# Patient Record
Sex: FEMALE | Race: WHITE | Employment: PART TIME | URBAN - METROPOLITAN AREA
[De-identification: names, ages, dates, MRNs, and addresses within clinical notes are randomized per-mention and may not be internally consistent; named-entity substitution may affect disease eponyms.]

---

## 2021-04-21 ENCOUNTER — ANESTHESIA (OUTPATIENT)
Dept: OPERATING ROOM | Age: 66
End: 2021-04-21
Payer: MEDICARE

## 2021-04-21 ENCOUNTER — HOSPITAL ENCOUNTER (OUTPATIENT)
Dept: GENERAL RADIOLOGY | Age: 66
Setting detail: OUTPATIENT SURGERY
Discharge: HOME | End: 2021-04-23
Attending: ORTHOPAEDIC SURGERY
Payer: MEDICARE

## 2021-04-21 ENCOUNTER — HOSPITAL ENCOUNTER (OUTPATIENT)
Age: 66
Setting detail: OUTPATIENT SURGERY
Discharge: HOME | End: 2021-04-21
Attending: ORTHOPAEDIC SURGERY | Admitting: ORTHOPAEDIC SURGERY
Payer: MEDICARE

## 2021-04-21 ENCOUNTER — ANESTHESIA EVENT (OUTPATIENT)
Dept: OPERATING ROOM | Age: 66
End: 2021-04-21
Payer: MEDICARE

## 2021-04-21 VITALS
HEIGHT: 60 IN | TEMPERATURE: 98.1 F | WEIGHT: 158 LBS | HEART RATE: 74 BPM | RESPIRATION RATE: 18 BRPM | SYSTOLIC BLOOD PRESSURE: 123 MMHG | BODY MASS INDEX: 31.02 KG/M2 | OXYGEN SATURATION: 97 % | DIASTOLIC BLOOD PRESSURE: 60 MMHG

## 2021-04-21 VITALS — DIASTOLIC BLOOD PRESSURE: 58 MMHG | OXYGEN SATURATION: 96 % | SYSTOLIC BLOOD PRESSURE: 115 MMHG

## 2021-04-21 DIAGNOSIS — R52 PAIN: ICD-10-CM

## 2021-04-21 LAB — SARS-COV-2, NAAT: NOT DETECTED

## 2021-04-21 PROCEDURE — 2580000003 HC RX 258: Performed by: ORTHOPAEDIC SURGERY

## 2021-04-21 PROCEDURE — 7100000011 HC PHASE II RECOVERY - ADDTL 15 MIN: Performed by: ORTHOPAEDIC SURGERY

## 2021-04-21 PROCEDURE — 7100000010 HC PHASE II RECOVERY - FIRST 15 MIN: Performed by: ORTHOPAEDIC SURGERY

## 2021-04-21 PROCEDURE — 3700000000 HC ANESTHESIA ATTENDED CARE: Performed by: ORTHOPAEDIC SURGERY

## 2021-04-21 PROCEDURE — 6360000002 HC RX W HCPCS: Performed by: ANESTHESIOLOGY

## 2021-04-21 PROCEDURE — 3600000004 HC SURGERY LEVEL 4 BASE: Performed by: ORTHOPAEDIC SURGERY

## 2021-04-21 PROCEDURE — 2709999900 HC NON-CHARGEABLE SUPPLY: Performed by: ORTHOPAEDIC SURGERY

## 2021-04-21 PROCEDURE — C1713 ANCHOR/SCREW BN/BN,TIS/BN: HCPCS | Performed by: ORTHOPAEDIC SURGERY

## 2021-04-21 PROCEDURE — 6360000002 HC RX W HCPCS: Performed by: ORTHOPAEDIC SURGERY

## 2021-04-21 PROCEDURE — 6370000000 HC RX 637 (ALT 250 FOR IP): Performed by: ANESTHESIOLOGY

## 2021-04-21 PROCEDURE — 3209999900 FLUORO FOR SURGICAL PROCEDURES

## 2021-04-21 PROCEDURE — 6360000002 HC RX W HCPCS: Performed by: NURSE ANESTHETIST, CERTIFIED REGISTERED

## 2021-04-21 PROCEDURE — 87635 SARS-COV-2 COVID-19 AMP PRB: CPT

## 2021-04-21 PROCEDURE — 3700000001 HC ADD 15 MINUTES (ANESTHESIA): Performed by: ORTHOPAEDIC SURGERY

## 2021-04-21 PROCEDURE — 3600000014 HC SURGERY LEVEL 4 ADDTL 15MIN: Performed by: ORTHOPAEDIC SURGERY

## 2021-04-21 PROCEDURE — 2500000003 HC RX 250 WO HCPCS: Performed by: ORTHOPAEDIC SURGERY

## 2021-04-21 DEVICE — IMPLANTABLE DEVICE: Type: IMPLANTABLE DEVICE | Status: FUNCTIONAL

## 2021-04-21 RX ORDER — SIMVASTATIN 20 MG
TABLET ORAL
COMMUNITY

## 2021-04-21 RX ORDER — TRAZODONE HYDROCHLORIDE 100 MG/1
50 TABLET ORAL
COMMUNITY

## 2021-04-21 RX ORDER — CALCIUM CARBONATE 200(500)MG
TABLET,CHEWABLE ORAL
COMMUNITY
Start: 2020-07-23

## 2021-04-21 RX ORDER — PROPOFOL 10 MG/ML
INJECTION, EMULSION INTRAVENOUS CONTINUOUS PRN
Status: DISCONTINUED | OUTPATIENT
Start: 2021-04-21 | End: 2021-04-21 | Stop reason: SDUPTHER

## 2021-04-21 RX ORDER — LIDOCAINE HYDROCHLORIDE 10 MG/ML
1 INJECTION, SOLUTION EPIDURAL; INFILTRATION; INTRACAUDAL; PERINEURAL
Status: COMPLETED | OUTPATIENT
Start: 2021-04-21 | End: 2021-04-21

## 2021-04-21 RX ORDER — MAGNESIUM HYDROXIDE 1200 MG/15ML
LIQUID ORAL CONTINUOUS PRN
Status: COMPLETED | OUTPATIENT
Start: 2021-04-21 | End: 2021-04-21

## 2021-04-21 RX ORDER — DIPHENHYDRAMINE HYDROCHLORIDE 50 MG/ML
12.5 INJECTION INTRAMUSCULAR; INTRAVENOUS
Status: DISCONTINUED | OUTPATIENT
Start: 2021-04-21 | End: 2021-04-21 | Stop reason: HOSPADM

## 2021-04-21 RX ORDER — BUPIVACAINE HYDROCHLORIDE 5 MG/ML
30 INJECTION, SOLUTION EPIDURAL; INTRACAUDAL
Status: COMPLETED | OUTPATIENT
Start: 2021-04-21 | End: 2021-04-21

## 2021-04-21 RX ORDER — OXYBUTYNIN CHLORIDE 10 MG/1
TABLET, EXTENDED RELEASE ORAL
COMMUNITY

## 2021-04-21 RX ORDER — PROPOFOL 10 MG/ML
INJECTION, EMULSION INTRAVENOUS PRN
Status: DISCONTINUED | OUTPATIENT
Start: 2021-04-21 | End: 2021-04-21 | Stop reason: SDUPTHER

## 2021-04-21 RX ORDER — FENTANYL CITRATE 50 UG/ML
25 INJECTION, SOLUTION INTRAMUSCULAR; INTRAVENOUS EVERY 10 MIN PRN
Status: DISCONTINUED | OUTPATIENT
Start: 2021-04-21 | End: 2021-04-21 | Stop reason: HOSPADM

## 2021-04-21 RX ORDER — HYDROCODONE BITARTRATE AND ACETAMINOPHEN 5; 325 MG/1; MG/1
1 TABLET ORAL PRN
Status: COMPLETED | OUTPATIENT
Start: 2021-04-21 | End: 2021-04-21

## 2021-04-21 RX ORDER — ATENOLOL 50 MG/1
TABLET ORAL
COMMUNITY

## 2021-04-21 RX ORDER — ALBUTEROL SULFATE 90 UG/1
AEROSOL, METERED RESPIRATORY (INHALATION)
COMMUNITY

## 2021-04-21 RX ORDER — MIDAZOLAM HYDROCHLORIDE 1 MG/ML
INJECTION INTRAMUSCULAR; INTRAVENOUS PRN
Status: DISCONTINUED | OUTPATIENT
Start: 2021-04-21 | End: 2021-04-21 | Stop reason: SDUPTHER

## 2021-04-21 RX ORDER — CEFAZOLIN SODIUM 2 G/50ML
2000 SOLUTION INTRAVENOUS
Status: COMPLETED | OUTPATIENT
Start: 2021-04-21 | End: 2021-04-21

## 2021-04-21 RX ORDER — HYDROCODONE BITARTRATE AND ACETAMINOPHEN 5; 325 MG/1; MG/1
2 TABLET ORAL PRN
Status: COMPLETED | OUTPATIENT
Start: 2021-04-21 | End: 2021-04-21

## 2021-04-21 RX ORDER — LISINOPRIL 20 MG/1
TABLET ORAL
COMMUNITY

## 2021-04-21 RX ORDER — ONDANSETRON 2 MG/ML
4 INJECTION INTRAMUSCULAR; INTRAVENOUS
Status: DISCONTINUED | OUTPATIENT
Start: 2021-04-21 | End: 2021-04-21 | Stop reason: HOSPADM

## 2021-04-21 RX ORDER — MEPERIDINE HYDROCHLORIDE 25 MG/ML
12.5 INJECTION INTRAMUSCULAR; INTRAVENOUS; SUBCUTANEOUS EVERY 5 MIN PRN
Status: DISCONTINUED | OUTPATIENT
Start: 2021-04-21 | End: 2021-04-21 | Stop reason: HOSPADM

## 2021-04-21 RX ORDER — POTASSIUM CHLORIDE 750 MG/1
TABLET, FILM COATED, EXTENDED RELEASE ORAL
COMMUNITY

## 2021-04-21 RX ORDER — LIDOCAINE HYDROCHLORIDE 10 MG/ML
10 INJECTION, SOLUTION EPIDURAL; INFILTRATION; INTRACAUDAL; PERINEURAL
Status: COMPLETED | OUTPATIENT
Start: 2021-04-21 | End: 2021-04-21

## 2021-04-21 RX ORDER — ESTRADIOL 0.1 MG/G
CREAM VAGINAL
COMMUNITY
Start: 2020-06-26

## 2021-04-21 RX ORDER — ASCORBIC ACID 500 MG
500 TABLET ORAL
COMMUNITY
Start: 2020-07-23

## 2021-04-21 RX ORDER — SODIUM CHLORIDE, SODIUM LACTATE, POTASSIUM CHLORIDE, CALCIUM CHLORIDE 600; 310; 30; 20 MG/100ML; MG/100ML; MG/100ML; MG/100ML
INJECTION, SOLUTION INTRAVENOUS CONTINUOUS
Status: DISCONTINUED | OUTPATIENT
Start: 2021-04-21 | End: 2021-04-21 | Stop reason: HOSPADM

## 2021-04-21 RX ORDER — FUROSEMIDE 20 MG/1
TABLET ORAL
COMMUNITY

## 2021-04-21 RX ADMIN — HYDROCODONE BITARTRATE AND ACETAMINOPHEN 1 TABLET: 5; 325 TABLET ORAL at 12:14

## 2021-04-21 RX ADMIN — LIDOCAINE HYDROCHLORIDE 5 ML: 10 INJECTION, SOLUTION EPIDURAL; INFILTRATION; INTRACAUDAL; PERINEURAL at 10:51

## 2021-04-21 RX ADMIN — BUPIVACAINE HYDROCHLORIDE 5 ML: 5 INJECTION, SOLUTION EPIDURAL; INTRACAUDAL at 10:49

## 2021-04-21 RX ADMIN — LIDOCAINE HYDROCHLORIDE 0.1 ML: 10 INJECTION, SOLUTION EPIDURAL; INFILTRATION; INTRACAUDAL; PERINEURAL at 10:35

## 2021-04-21 RX ADMIN — PROPOFOL 120 MCG/KG/MIN: 10 INJECTION, EMULSION INTRAVENOUS at 11:15

## 2021-04-21 RX ADMIN — PROPOFOL 40 MG: 10 INJECTION, EMULSION INTRAVENOUS at 11:15

## 2021-04-21 RX ADMIN — CEFAZOLIN SODIUM 2000 MG: 2 SOLUTION INTRAVENOUS at 11:15

## 2021-04-21 RX ADMIN — SODIUM CHLORIDE, POTASSIUM CHLORIDE, SODIUM LACTATE AND CALCIUM CHLORIDE: 600; 310; 30; 20 INJECTION, SOLUTION INTRAVENOUS at 10:36

## 2021-04-21 RX ADMIN — MIDAZOLAM HYDROCHLORIDE 2 MG: 2 INJECTION, SOLUTION INTRAMUSCULAR; INTRAVENOUS at 11:07

## 2021-04-21 ASSESSMENT — PULMONARY FUNCTION TESTS
PIF_VALUE: 0

## 2021-04-21 ASSESSMENT — PAIN SCALES - GENERAL: PAINLEVEL_OUTOF10: 0

## 2021-04-21 ASSESSMENT — COPD QUESTIONNAIRES: CAT_SEVERITY: MILD

## 2021-04-21 NOTE — ANESTHESIA PRE PROCEDURE
Provider Last Rate Last Admin    lactated ringers infusion   Intravenous Continuous Harlan Moreau  mL/hr at 04/21/21 1036 New Bag at 04/21/21 1036    ceFAZolin (ANCEF) 2000 mg in dextrose 3 % 50 mL IVPB (duplex)  2,000 mg Intravenous On Call to Cecil Damico DO           Allergies: Allergies   Allergen Reactions    Oxycodone-Acetaminophen Nausea And Vomiting       Problem List:  There is no problem list on file for this patient. Past Medical History:        Diagnosis Date    Asthma     Hypertension        Past Surgical History:  History reviewed. No pertinent surgical history. Social History:    Social History     Tobacco Use    Smoking status: Not on file   Substance Use Topics    Alcohol use: Not on file                                Counseling given: Not Answered      Vital Signs (Current):   Vitals:    04/21/21 1022   BP: 139/67   Pulse: 73   Resp: 16   Temp: 98 °F (36.7 °C)   TempSrc: Temporal   SpO2: 97%   Weight: 158 lb (71.7 kg)   Height: 5' (1.524 m)                                              BP Readings from Last 3 Encounters:   04/21/21 139/67       NPO Status: Time of last liquid consumption: 2300                        Time of last solid consumption: 2200                        Date of last liquid consumption: 04/20/21                        Date of last solid food consumption: 04/20/21    BMI:   Wt Readings from Last 3 Encounters:   04/21/21 158 lb (71.7 kg)     Body mass index is 30.86 kg/m². CBC: No results found for: WBC, RBC, HGB, HCT, MCV, RDW, PLT    CMP: No results found for: NA, K, CL, CO2, BUN, CREATININE, GFRAA, AGRATIO, LABGLOM, GLUCOSE, PROT, CALCIUM, BILITOT, ALKPHOS, AST, ALT    POC Tests: No results for input(s): POCGLU, POCNA, POCK, POCCL, POCBUN, POCHEMO, POCHCT in the last 72 hours.     Coags: No results found for: PROTIME, INR, APTT    HCG (If Applicable): No results found for: PREGTESTUR, PREGSERUM, HCG, HCGQUANT     ABGs: No results found for: PHART, PO2ART, STM1TBP, CMK7JTY, BEART, Q7IRMRGT     Type & Screen (If Applicable):  No results found for: LABABO, LABRH    Drug/Infectious Status (If Applicable):  No results found for: HIV, HEPCAB    COVID-19 Screening (If Applicable):   Lab Results   Component Value Date    COVID19 Not Detected 04/21/2021           Anesthesia Evaluation    Airway: Mallampati: II  TM distance: >3 FB   Neck ROM: full  Mouth opening: > = 3 FB Dental:    (+) upper dentures      Pulmonary: breath sounds clear to auscultation  (+) COPD: mild,                             Cardiovascular:    (+) hypertension:,         Rhythm: regular             Beta Blocker:  Dose within 24 Hrs         Neuro/Psych:   Negative Neuro/Psych ROS              GI/Hepatic/Renal:   (+) GERD:,           Endo/Other: Negative Endo/Other ROS                    Abdominal:           Vascular: negative vascular ROS. Anesthesia Plan      MAC     ASA 3       Induction: intravenous. MIPS: Prophylactic antiemetics administered. Anesthetic plan and risks discussed with patient.       Plan discussed with surgical team.    Attending anesthesiologist reviewed and agrees with Pre Eval content              Kathy Willis MD   4/21/2021

## 2021-04-21 NOTE — ANESTHESIA POSTPROCEDURE EVALUATION
Department of Anesthesiology  Postprocedure Note    Patient: Hawa Duong  MRN: [id number]  YOB: 1955  Date of evaluation: 4/21/2021  Time:  11:44 AM     Procedure Summary     Date: 04/21/21 Room / Location: [address]    Anesthesia Start: 1109 Anesthesia Stop: 1807    Procedure: RIGHT CLOSED REDUCTION PERCUTANEOUS PINNING RIGHT LONG BONY MALLET (Right ) Diagnosis: (RIGHT LONG FINGER DISTAL PHALANX FRACTURE, BONY MALLET)    Surgeons: Ligia Ivan DO Responsible Provider: Afsaneh Mac MD    Anesthesia Type: MAC ASA Status: 3          Anesthesia Type: MAC    Anika Phase I:      Anika Phase II:      Last vitals: Reviewed and per EMR flowsheets.        Anesthesia Post Evaluation    Patient location during evaluation: PACU  Patient participation: complete - patient participated  Level of consciousness: awake and alert  Pain score: 0  Airway patency: patent  Nausea & Vomiting: no vomiting and no nausea  Complications: no  Cardiovascular status: hemodynamically stable  Respiratory status: room air  Hydration status: stable

## 2021-04-21 NOTE — OP NOTE
The patient was prepped and draped in the normal sterile fashion. Fluoroscopic imaging was brought in. The right long finger distal interphalangeal joint was placed into passive flexion. A K wire was started just radial to midline over the dorsum of the long finger just proximal to the bony mallet fracture fragment. The K wire was delivered bicortically into the middle phalanx. In a similar fashion a second K wire was placed again just dorsal and proximal to the bony mallet fracture fragment and anchored bicortically into the middle phalanx. Passive extension was then imparted to the distal end phalangeal joint thereby reducing the main body of P3 to the bony mallet fracture fragment. A 4 5 K wire was started at the tip of the distal phalanx and delivered in a retrograde fashion bicortically across the distal phalanx, traversing the distal interphalangeal joint, and anchored bicortically into the middle phalanx. The longitudinal K wire was cut in a subcutaneous position. The 2 dorsal K wires were cut and bent outside of the skin. Final images were sent to the PACS system. A soft dressing along with AlumaFoam splint was applied. The patient was allowed to head to recovery in stable condition. Overall she tolerated procedure well. Disposition: Stable to PACU          Implants:  Implant Name Type Inv.  Item Serial No.  Lot No. LRB No. Used Action   WIRE FIX NOAH [KWIRE] [[employer]]  WIRE FIX NOAH [KWIRE] [[employer]]  [employer] [address] [employer]  Right 1 Implanted             Electronically signed by Theo Ling DO on 4/21/2021 at 11:36 AM

## 2024-02-14 ENCOUNTER — APPOINTMENT (OUTPATIENT)
Dept: RADIOLOGY | Facility: HOSPITAL | Age: 69
End: 2024-02-14
Payer: COMMERCIAL

## 2024-02-14 ENCOUNTER — HOSPITAL ENCOUNTER (EMERGENCY)
Facility: HOSPITAL | Age: 69
Discharge: HOME | End: 2024-02-14
Payer: COMMERCIAL

## 2024-02-14 VITALS
RESPIRATION RATE: 16 BRPM | BODY MASS INDEX: 35.34 KG/M2 | TEMPERATURE: 97.3 F | SYSTOLIC BLOOD PRESSURE: 177 MMHG | HEIGHT: 60 IN | OXYGEN SATURATION: 97 % | WEIGHT: 180 LBS | DIASTOLIC BLOOD PRESSURE: 79 MMHG | HEART RATE: 60 BPM

## 2024-02-14 DIAGNOSIS — S60.221A CONTUSION OF RIGHT HAND, INITIAL ENCOUNTER: ICD-10-CM
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
## 2024-02-14 DIAGNOSIS — S93.401A SPRAIN OF RIGHT ANKLE, UNSPECIFIED LIGAMENT, INITIAL ENCOUNTER: ICD-10-CM

## 2024-02-14 DIAGNOSIS — S01.81XA FACIAL LACERATION, INITIAL ENCOUNTER: ICD-10-CM

## 2024-02-14 PROCEDURE — 12011 RPR F/E/E/N/L/M 2.5 CM/<: CPT

## 2024-02-14 PROCEDURE — 73610 X-RAY EXAM OF ANKLE: CPT | Mod: RT

## 2024-02-14 PROCEDURE — 70450 CT HEAD/BRAIN W/O DYE: CPT

## 2024-02-14 PROCEDURE — 73130 X-RAY EXAM OF HAND: CPT | Mod: RIGHT SIDE | Performed by: STUDENT IN AN ORGANIZED HEALTH CARE EDUCATION/TRAINING PROGRAM

## 2024-02-14 PROCEDURE — 2500000001 HC RX 250 WO HCPCS SELF ADMINISTERED DRUGS (ALT 637 FOR MEDICARE OP)

## 2024-02-14 PROCEDURE — 73110 X-RAY EXAM OF WRIST: CPT | Mod: RIGHT SIDE | Performed by: STUDENT IN AN ORGANIZED HEALTH CARE EDUCATION/TRAINING PROGRAM

## 2024-02-14 PROCEDURE — 73110 X-RAY EXAM OF WRIST: CPT | Mod: RT

## 2024-02-14 PROCEDURE — 73130 X-RAY EXAM OF HAND: CPT | Mod: RT

## 2024-02-14 PROCEDURE — 73610 X-RAY EXAM OF ANKLE: CPT | Mod: RIGHT SIDE | Performed by: STUDENT IN AN ORGANIZED HEALTH CARE EDUCATION/TRAINING PROGRAM

## 2024-02-14 PROCEDURE — 76377 3D RENDER W/INTRP POSTPROCES: CPT

## 2024-02-14 PROCEDURE — 99285 EMERGENCY DEPT VISIT HI MDM: CPT | Mod: 25

## 2024-02-14 PROCEDURE — 72125 CT NECK SPINE W/O DYE: CPT

## 2024-02-14 PROCEDURE — 70486 CT MAXILLOFACIAL W/O DYE: CPT

## 2024-02-14 RX ORDER — VALSARTAN 40 MG/1
40 TABLET ORAL ONCE
Status: COMPLETED | OUTPATIENT
Start: 2024-02-14 | End: 2024-02-14

## 2024-02-14 RX ORDER — BACITRACIN ZINC 500 UNIT/G
1 OINTMENT (GRAM) TOPICAL 2 TIMES DAILY
Qty: 1 G | Refills: 0 | Status: SHIPPED | OUTPATIENT
Start: 2024-02-14 | End: 2024-02-19

## 2024-02-14 RX ORDER — ACETAMINOPHEN 500 MG
500 TABLET ORAL EVERY 6 HOURS PRN
Qty: 28 TABLET | Refills: 0 | Status: SHIPPED | OUTPATIENT
Start: 2024-02-14 | End: 2024-02-21

## 2024-02-14 RX ADMIN — VALSARTAN 40 MG: 40 TABLET, FILM COATED ORAL at 21:30

## 2024-02-14 ASSESSMENT — LIFESTYLE VARIABLES
EVER FELT BAD OR GUILTY ABOUT YOUR DRINKING: NO
EVER HAD A DRINK FIRST THING IN THE MORNING TO STEADY YOUR NERVES TO GET RID OF A HANGOVER: NO
HAVE PEOPLE ANNOYED YOU BY CRITICIZING YOUR DRINKING: NO
HAVE YOU EVER FELT YOU SHOULD CUT DOWN ON YOUR DRINKING: NO

## 2024-02-14 ASSESSMENT — PAIN DESCRIPTION - PAIN TYPE: TYPE: ACUTE PAIN

## 2024-02-14 ASSESSMENT — PAIN - FUNCTIONAL ASSESSMENT: PAIN_FUNCTIONAL_ASSESSMENT: 0-10

## 2024-02-14 ASSESSMENT — COLUMBIA-SUICIDE SEVERITY RATING SCALE - C-SSRS
2. HAVE YOU ACTUALLY HAD ANY THOUGHTS OF KILLING YOURSELF?: NO
6. HAVE YOU EVER DONE ANYTHING, STARTED TO DO ANYTHING, OR PREPARED TO DO ANYTHING TO END YOUR LIFE?: NO
1. IN THE PAST MONTH, HAVE YOU WISHED YOU WERE DEAD OR WISHED YOU COULD GO TO SLEEP AND NOT WAKE UP?: NO

## 2024-02-14 ASSESSMENT — PAIN DESCRIPTION - LOCATION: LOCATION: ANKLE

## 2024-02-14 ASSESSMENT — PAIN DESCRIPTION - ORIENTATION: ORIENTATION: RIGHT

## 2024-02-14 ASSESSMENT — PAIN SCALES - GENERAL: PAINLEVEL_OUTOF10: 2

## 2024-02-14 NOTE — Clinical Note
Roselia Alvarez was seen and treated in our emergency department on 2/14/2024.  She may return to work on 02/16/2024.       If you have any questions or concerns, please don't hesitate to call.      Avery Saleh PA-C

## 2024-02-15 NOTE — ED PROVIDER NOTES
HPI   Chief Complaint   Patient presents with    Fall     Pt tripped in parking lot and fell, complaining of R ankle pain and has a laceration over L eye, bleeding controlled. Denies blood thinners, LOC.        History provided by: Patient and patient's coworker    Limitations to history: None    CC: Fall    HPI: 69-year-old female presents emergency department to be evaluated for a fall that occurred about an hour prior to arrival.  Patient was getting off work when she tripped in a pothole in the parking lot causing her to fall.  She did hit the left side of her face on the pothole and has a cut on the left side of her face that bled for a few minutes but is now controlled on external pressure.  Her tetanus is up-to-date.  She denies headache, vision changes, neck pain, back pain, lightheadedness and dizziness.  Denies any injury to her eye.  She denies use of anticoagulants or history of intracranial hemorrhage.  Coworkers with her denies behavior mental status changes.  She is currently complaining of pain where she hit her face as well as in her right wrist and her right ankle.  She is able to bear weight and denies numbness or tingling denies chest pain and difficulty breathing.  Denies blood in urine or stool.  Denies pain or injury elsewhere.  Denies numbness, tingling, weakness in the extremities.  Does report some swelling of her ankle.  Denies all other systemic symptoms.    Patient's blood pressure is 187/81, she has not taken her antihypertension regimen tonight    ROS: Negative unless mentioned in HPI    Social Hx: Denies tobacco, alcohol, drug use    Medical Hx: Medical history significant for PAD, hypertension, arthritis.  Denies allergies.  Medications up-to-date.    Physical exam:    Constitutional: Patient is well-nourished and well-developed.  Sitting comfortably in the room and in no distress.  Oriented to person, place, time, and situation.    HEENT: Head is normocephalic. Patient's airway is  patent.  Tympanic membranes are clear bilaterally.  Nasal mucosa clear.  Mouth with normal mucosa.  Throat is not erythematous and there are no oropharyngeal exudates, uvula is midline.  No obvious facial deformities.  No Hewitt sign or raccoon eyes.  No nuchal rigidity or meningeal signs.  She is full range of motion in her neck and no midline cervical spine tenderness.  Patient has a small amount of swelling and bruising over the left temple.  Patient has a very small about 0.25 cm laceration that is well aligned and not gaping.  No active bleeding.    Eyes: Clear bilaterally.  Pupils are equal round and reactive to light and accommodation.  Extraocular movements intact.      Cardiac: Regular rate, regular rhythm.  Heart sounds S1, S2.  No murmurs, rubs, or gallops.  PMI nondisplaced.  No JVD.    Respiratory: Regular respiratory rate and effort.  Breath sounds are clear and equal bilaterally, no adventitious lung sounds.  Patient is speaking in full sentences and is in no apparent respiratory distress. No use of accessory muscles.      Gastrointestinal: Abdomen is soft, nondistended, and nontender.  There are no obvious deformities.  No rebound tenderness or guarding.  Bowel sounds are normal active.    Genitourinary: No CVA or flank tenderness.    Musculoskeletal: Patient has reproducible tenderness over the distal first metacarpal of the right hand and proximal phalanx.  No tenderness in the anatomical snuffbox.  Patient also has tenderness over the lateral malleolus of the right ankle with some small amount of swelling.  No tenderness over the Achilles and she is full ability to plantarflex and dorsiflex.  No obvious bony deformities.  Patient has equal range of motion in all extremities and no strength deficiencies.  No muscle or joint tenderness. No back or neck tenderness.  Capillary refill less than 3 seconds.  Strong peripheral pulses.  No sensory deficits.    Neurological: Patient is alert and oriented.   No focal deficits.  5/5 strength in all extremities.  Cranial nerves II through XII intact. GCS15.     Skin: Skin is normal color for race and is warm, dry, and intact.  No evidence of trauma.  No lesions, rashes, bruising, jaundice, or masses.    Psych: Appropriate mood and affect.  No apparent risk to self or others.    Heme/lymph: No adenopathy, lymphadenopathy, or splenomegaly    Physical exam is otherwise negative unless stated above or in history of present illness.                          [place] Coma Scale Score: 15                     Patient History   Past Medical History:   Diagnosis Date    Personal history of other diseases of the circulatory system 04/19/2021    History of essential hypertension    Personal history of other diseases of the musculoskeletal system and connective tissue 04/19/2021    History of arthritis    Personal history of other diseases of the nervous system and sense organs 04/19/2021    History of eye problem    Personal history of other diseases of the respiratory system 04/19/2021    History of asthma     Past Surgical History:   Procedure Laterality Date    OTHER SURGICAL HISTORY  04/19/2021    Endovenous laser ablation     No family history on file.  Social History     Tobacco Use    Smoking status: Not on file    Smokeless tobacco: Not on file   Substance Use Topics    Alcohol use: Not on file    Drug use: Not on file       Physical Exam   ED Triage Vitals [02/14/24 1904]   Temperature Heart Rate Respirations BP   36.3 °C (97.3 °F) 62 16 (!) 187/81      Pulse Ox Temp Source Heart Rate Source Patient Position   95 % Temporal Monitor Sitting      BP Location FiO2 (%)     Right arm --       Physical Exam    ED Course & MDM   Diagnoses as of 02/14/24 2145   Fall, initial encounter   Facial laceration, initial encounter   Contusion of right hand, initial encounter   Sprain of right ankle, unspecified ligament, initial encounter     Patient updated on plan for lab testing, IV  insertion, radiology imaging, and medications to be administered while in the ER (if indicated). Patient updated on expected wait times for testing and results. Patient provided my name and told to ask any staff member for questions or concerns if they should arise. Electronic medical record reviewed.     MDM    Upon initial assessment, patient was healthy non-toxic appearing and in no apparent distress.     Patient presented to the emergency department with the chief complaint of a head injury and right ankle and right  wrist injury.  Head is normocephalic. Patient's airway is patent.  Tympanic membranes are clear bilaterally.  Nasal mucosa clear.  Mouth with normal mucosa.  Throat is not erythematous and there are no oropharyngeal exudates, uvula is midline.  No obvious facial deformities.  No Hewitt sign or raccoon eyes.  No nuchal rigidity or meningeal signs.  She is full range of motion in her neck and no midline cervical spine tenderness.  Patient has a small amount of swelling and bruising over the left temple.  Patient has a very small about 0.25 cm laceration that is well aligned and not gaping.  No active bleeding.  Patient has tenderness over the distal first metacarpal and proximal phalanx of the right hand without any swelling or bony abnormality.  She has full range of motion in the extremity.  She has tenderness over the lateral malleolus of the right ankle with some bruising but has no tenderness over the Achilles and has full ability to dorsiflex and plantarflex.  Examination of her heart and lungs unremarkable.  On arrival to the emergency department, vital signs were significant for hypertension but otherwise unremarkable.    Will get an x-ray of her right wrist and ankle and will get a CT of the head, neck, and face.   will have the nursing staff also clean the patient's wound.  Will also give the patient her nighttime antihypertension regimen.    X-ray of the extremities revealed no fracture or  subluxation.  CT of the head revealed no skull fracture or intracranial hemorrhage but CT of the C-spine revealed no traumatic injury and CT of the face revealed no facial bone fracture.  Patient's laceration was addressed using skin glue given that it is very superficial and well-approximated.  She tolerated this well.  Patient will be discharged with p.o. Tylenol and an Ace wrap for her hand and ankle.  I discussed rest, ice, compression, and elevation.  I discussed cool compresses for the small amount of swelling of her face.  She will follow-up with her PCP.  Wound care discussed including signs of infection and she will be given topical bacitracin.  All questions and concerns addressed.  Reasons to return to ER discussed.  Patient verbalized understanding and agreement with the treatment plan and they remained hemodynamically stable in the ER.    This note was dictated using a speech recognition program.  While an attempt was made at proof-reading to minimize errors, minor errors in transcription may be present    Medical Decision Making      Procedure  Laceration Repair    Performed by: Avery Saleh PA-C  Authorized by: Avery Saleh PA-C    Consent:     Consent obtained:  Verbal    Consent given by:  Patient    Risks discussed:  Pain  Universal protocol:     Patient identity confirmed:  Verbally with patient  Anesthesia:     Anesthesia method:  None  Laceration details:     Location:  Face    Facial location: left temple.    Length (cm):  0.3  Pre-procedure details:     Preparation:  Patient was prepped and draped in usual sterile fashion  Exploration:     Wound extent: no muscle damage noted, no nerve damage noted, no tendon damage noted, no underlying fracture noted and no vascular damage noted    Treatment:     Area cleansed with:  Chlorhexidine    Amount of cleaning:  Standard    Irrigation solution:  Sterile saline    Debridement:  None    Layers repaired: skin.  Skin repair:     Repair method:   Tissue adhesive  Approximation:     Approximation:  Close  Repair type:     Repair type:  Simple  Post-procedure details:     Dressing:  Open (no dressing)    Procedure completion:  Tolerated       Avery Saleh PA-C  02/14/24 2135       Avery Saleh PA-C  02/14/24 2147

## 2024-05-24 ENCOUNTER — HOSPITAL ENCOUNTER (OUTPATIENT)
Dept: RADIOLOGY | Facility: HOSPITAL | Age: 69
Discharge: HOME | End: 2024-05-24
Payer: MEDICARE

## 2024-05-24 DIAGNOSIS — E78.00 PURE HYPERCHOLESTEROLEMIA, UNSPECIFIED: ICD-10-CM

## 2024-05-24 PROCEDURE — 75571 CT HRT W/O DYE W/CA TEST: CPT

## 2025-07-08 LAB
Lab: 112 MMOL/DAY (ref 40–220)
Lab: 1400 ML
Lab: 80 MMOL/L
NON-UH HIE CREATININE 24 HR, U: 0.8 GM/24HR (ref 0.6–1.8)
NON-UH HIE U CRETN: 54.5 MG/DL
NON-UH HIE U VOLCRET: 1400 ML

## 2025-07-10 LAB — NON-UH HIE MISC SENDOUT: NORMAL

## 2025-07-14 LAB
NON-UH HIE ALB: 4.1 G/DL (ref 3.4–5)
NON-UH HIE BUN/CREAT RATIO: 20.8
NON-UH HIE BUN: 25 MG/DL (ref 9–23)
NON-UH HIE CALCIUM: 10.5 MG/DL (ref 8.7–10.4)
NON-UH HIE CALCULATED OSMOLALITY: 286 MOSM/KG (ref 275–295)
NON-UH HIE CHLORIDE: 106 MMOL/L (ref 98–107)
NON-UH HIE CO2, VENOUS: 27 MMOL/L (ref 20–31)
NON-UH HIE CORRECTED CALCIUM: ABNORMAL MG/DL (ref 8.5–10.5)
NON-UH HIE CREATININE: 1.2 MG/DL (ref 0.5–0.8)
NON-UH HIE GFR AA: 53
NON-UH HIE GFR ESTIMATED: 44
NON-UH HIE GLOMERULAR FILTRATION RATE: 44 ML/MIN/1.73M?
NON-UH HIE GLUCOSE: 104 MG/DL (ref 74–106)
NON-UH HIE I-PTH: 159 PG/ML (ref 18.4–80.1)
NON-UH HIE K: 4.2 MMOL/L (ref 3.5–5.1)
NON-UH HIE MAGNESIUM: 2 MG/DL (ref 1.6–2.6)
NON-UH HIE NA: 141 MMOL/L (ref 135–145)
NON-UH HIE PHOSPHORUS: 3.3 MG/DL (ref 2–5.1)
NON-UH HIE TSH: 1.54 UIU/ML (ref 0.55–4.78)
NON-UH HIE VIT D 25: 45 NG/ML

## (undated) DEVICE — DRESSING PETRO W3XL8IN OIL EMUL N ADH GZ KNIT IMPREG CELOS

## (undated) DEVICE — GOWN,AURORA,NONREINFORCED,LARGE: Brand: MEDLINE

## (undated) DEVICE — C-ARM: Brand: UNBRANDED

## (undated) DEVICE — GLOVE ORANGE PI 7   MSG9070

## (undated) DEVICE — GLOVE ORTHO 7 1/2   MSG9475

## (undated) DEVICE — ZIMMER® STERILE DISPOSABLE TOURNIQUET CUFF, DUAL PORT, SINGLE BLADDER, 18 IN. (46 CM)

## (undated) DEVICE — BANDAGE COMPR W3INXL5YD WHT BGE POLY COT M E WRP WV HK AND

## (undated) DEVICE — COVER LT HNDL BLU PLAS

## (undated) DEVICE — APPLICATOR MEDICATED 26 CC SOLUTION HI LT ORNG CHLORAPREP

## (undated) DEVICE — LABEL MED MINI W/ MARKER

## (undated) DEVICE — PADDING CAST N ADH 4 YDX3 IN HIGHLY ABSORBENT EZ APPL SOFROL

## (undated) DEVICE — SPONGE GZ W4XL4IN COT 12 PLY TYP VII WVN C FLD DSGN

## (undated) DEVICE — GLOVE ORANGE PI 7 1/2   MSG9075

## (undated) DEVICE — HAND II: Brand: MEDLINE INDUSTRIES, INC.